# Patient Record
Sex: FEMALE | Race: WHITE | NOT HISPANIC OR LATINO | Employment: PART TIME | URBAN - METROPOLITAN AREA
[De-identification: names, ages, dates, MRNs, and addresses within clinical notes are randomized per-mention and may not be internally consistent; named-entity substitution may affect disease eponyms.]

---

## 2017-07-21 ENCOUNTER — CONVERSION ENCOUNTER (OUTPATIENT)
Dept: MAMMOGRAPHY | Facility: HOSPITAL | Age: 57
End: 2017-07-21

## 2018-11-09 ENCOUNTER — CONVERSION ENCOUNTER (OUTPATIENT)
Dept: MAMMOGRAPHY | Facility: HOSPITAL | Age: 58
End: 2018-11-09

## 2020-01-17 ENCOUNTER — HOSPITAL ENCOUNTER (OUTPATIENT)
Dept: MAMMOGRAPHY | Facility: HOSPITAL | Age: 60
Discharge: HOME | End: 2020-01-17
Attending: INTERNAL MEDICINE

## 2020-05-19 ENCOUNTER — HOSPITAL ENCOUNTER (OUTPATIENT)
Dept: OTHER | Facility: HOSPITAL | Age: 60
Discharge: HOME | End: 2020-05-19
Attending: INTERNAL MEDICINE

## 2020-05-19 LAB
25(OH)D3 SERPL-MCNC: 52.8 NG/ML (ref 30–100)
ALBUMIN SERPL-MCNC: 4.6 G/DL (ref 3.5–5)
ALBUMIN/GLOB SERPL: 1.7 {RATIO} (ref 1.4–2.6)
ALP SERPL-CCNC: 82 U/L (ref 53–141)
ALT SERPL-CCNC: 18 U/L (ref 10–40)
ANION GAP SERPL CALC-SCNC: 21 MMOL/L (ref 8–19)
AST SERPL-CCNC: 20 U/L (ref 15–50)
BASOPHILS # BLD AUTO: 0.05 10*3/UL (ref 0–0.2)
BASOPHILS NFR BLD AUTO: 1 % (ref 0–3)
BILIRUB SERPL-MCNC: 0.71 MG/DL (ref 0.2–1.3)
BUN SERPL-MCNC: 10 MG/DL (ref 5–25)
BUN/CREAT SERPL: 12 {RATIO} (ref 6–20)
CALCIUM SERPL-MCNC: 9.9 MG/DL (ref 8.7–10.4)
CHLORIDE SERPL-SCNC: 104 MMOL/L (ref 99–111)
CHOLEST SERPL-MCNC: 189 MG/DL (ref 107–200)
CHOLEST/HDLC SERPL: 2.4 {RATIO} (ref 3–6)
CK SERPL-CCNC: 86 U/L (ref 35–230)
CONV ABS IMM GRAN: 0.01 10*3/UL (ref 0–0.2)
CONV CO2: 20 MMOL/L (ref 22–32)
CONV IMMATURE GRAN: 0.2 % (ref 0–1.8)
CONV TOTAL PROTEIN: 7.3 G/DL (ref 6.3–8.2)
CREAT UR-MCNC: 0.81 MG/DL (ref 0.5–0.9)
DEPRECATED RDW RBC AUTO: 44.6 FL (ref 36.4–46.3)
EOSINOPHIL # BLD AUTO: 0.11 10*3/UL (ref 0–0.7)
EOSINOPHIL # BLD AUTO: 2.3 % (ref 0–7)
ERYTHROCYTE [DISTWIDTH] IN BLOOD BY AUTOMATED COUNT: 12.4 % (ref 11.7–14.4)
GFR SERPLBLD BASED ON 1.73 SQ M-ARVRAT: >60 ML/MIN/{1.73_M2}
GLOBULIN UR ELPH-MCNC: 2.7 G/DL (ref 2–3.5)
GLUCOSE SERPL-MCNC: 89 MG/DL (ref 65–99)
HCT VFR BLD AUTO: 46.9 % (ref 37–47)
HDLC SERPL-MCNC: 79 MG/DL (ref 40–60)
HGB BLD-MCNC: 14.7 G/DL (ref 12–16)
LDLC SERPL CALC-MCNC: 99 MG/DL (ref 70–100)
LYMPHOCYTES # BLD AUTO: 1.48 10*3/UL (ref 1–5)
LYMPHOCYTES NFR BLD AUTO: 31 % (ref 20–45)
MCH RBC QN AUTO: 30.6 PG (ref 27–31)
MCHC RBC AUTO-ENTMCNC: 31.3 G/DL (ref 33–37)
MCV RBC AUTO: 97.5 FL (ref 81–99)
MONOCYTES # BLD AUTO: 0.39 10*3/UL (ref 0.2–1.2)
MONOCYTES NFR BLD AUTO: 8.2 % (ref 3–10)
NEUTROPHILS # BLD AUTO: 2.73 10*3/UL (ref 2–8)
NEUTROPHILS NFR BLD AUTO: 57.3 % (ref 30–85)
NRBC CBCN: 0 % (ref 0–0.7)
OSMOLALITY SERPL CALC.SUM OF ELEC: 291 MOSM/KG (ref 273–304)
PLATELET # BLD AUTO: 237 10*3/UL (ref 130–400)
PMV BLD AUTO: 11.4 FL (ref 9.4–12.3)
POTASSIUM SERPL-SCNC: 4.4 MMOL/L (ref 3.5–5.3)
RBC # BLD AUTO: 4.81 10*6/UL (ref 4.2–5.4)
SODIUM SERPL-SCNC: 141 MMOL/L (ref 135–147)
TRIGL SERPL-MCNC: 53 MG/DL (ref 40–150)
VLDLC SERPL-MCNC: 11 MG/DL (ref 5–37)
WBC # BLD AUTO: 4.77 10*3/UL (ref 4.8–10.8)

## 2020-05-20 LAB
FOLATE SERPL-MCNC: >20 NG/ML (ref 4.8–20)
VIT B12 SERPL-MCNC: 979 PG/ML (ref 211–911)

## 2021-03-05 ENCOUNTER — HOSPITAL ENCOUNTER (OUTPATIENT)
Dept: MAMMOGRAPHY | Facility: HOSPITAL | Age: 61
Discharge: HOME | End: 2021-03-05
Attending: INTERNAL MEDICINE

## 2022-02-04 ENCOUNTER — TRANSCRIBE ORDERS (OUTPATIENT)
Dept: ADMINISTRATIVE | Facility: HOSPITAL | Age: 62
End: 2022-02-04

## 2022-02-04 DIAGNOSIS — Z12.31 SCREENING MAMMOGRAM FOR BREAST CANCER: Primary | ICD-10-CM

## 2022-03-18 ENCOUNTER — HOSPITAL ENCOUNTER (OUTPATIENT)
Dept: MAMMOGRAPHY | Facility: HOSPITAL | Age: 62
Discharge: HOME | End: 2022-03-18
Admitting: INTERNAL MEDICINE

## 2022-03-18 DIAGNOSIS — Z12.31 SCREENING MAMMOGRAM FOR BREAST CANCER: ICD-10-CM

## 2022-03-18 PROCEDURE — 77063 BREAST TOMOSYNTHESIS BI: CPT

## 2022-03-18 PROCEDURE — 77067 SCR MAMMO BI INCL CAD: CPT

## 2023-02-23 ENCOUNTER — TRANSCRIBE ORDERS (OUTPATIENT)
Dept: ADMINISTRATIVE | Facility: HOSPITAL | Age: 63
End: 2023-02-23
Payer: COMMERCIAL

## 2023-02-23 DIAGNOSIS — Z12.31 ENCOUNTER FOR SCREENING MAMMOGRAM FOR BREAST CANCER: Primary | ICD-10-CM

## 2023-05-12 ENCOUNTER — HOSPITAL ENCOUNTER (OUTPATIENT)
Dept: MAMMOGRAPHY | Facility: HOSPITAL | Age: 63
Discharge: HOME | End: 2023-05-12
Admitting: INTERNAL MEDICINE
Payer: COMMERCIAL

## 2023-05-12 DIAGNOSIS — Z12.31 ENCOUNTER FOR SCREENING MAMMOGRAM FOR BREAST CANCER: ICD-10-CM

## 2023-05-12 PROCEDURE — 77063 BREAST TOMOSYNTHESIS BI: CPT

## 2023-05-12 PROCEDURE — 77067 SCR MAMMO BI INCL CAD: CPT

## 2023-10-24 ENCOUNTER — TRANSCRIBE ORDERS (OUTPATIENT)
Dept: ADMINISTRATIVE | Facility: HOSPITAL | Age: 63
End: 2023-10-24
Payer: COMMERCIAL

## 2023-10-24 DIAGNOSIS — M81.0 SENILE OSTEOPOROSIS: Primary | ICD-10-CM

## 2023-11-01 ENCOUNTER — HOSPITAL ENCOUNTER (OUTPATIENT)
Dept: BONE DENSITY | Facility: HOSPITAL | Age: 63
Discharge: HOME | End: 2023-11-01
Admitting: INTERNAL MEDICINE
Payer: COMMERCIAL

## 2023-11-01 DIAGNOSIS — M81.0 SENILE OSTEOPOROSIS: ICD-10-CM

## 2023-11-01 PROCEDURE — 77080 DXA BONE DENSITY AXIAL: CPT

## 2025-05-19 NOTE — PROGRESS NOTES
"GYN new patient    CC: prolapse    Tobacco/Nicotine use:  No    HPI:   65 y.o. Contraception or HRT: Post menopausal, using no HRT    History of Present Illness  The patient presents for evaluation of a prolapse.    She was previously diagnosed with a prolapse during a Pap smear examination. She reports no symptoms such as bulging, pressure, or difficulty in bladder emptying. She is not sexually active and experienced significant discomfort during the Pap smear procedure. She retains her uterus and expresses a preference for natural treatment options.    GYNECOLOGICAL HISTORY:  - Gynecology History discussed      History: PMHx, Meds, Allergies, PSHx, Social Hx, and POBHx all reviewed and updated.  PCP:Carmenza Lucero MD      Review of Systems     /74   Pulse 89   Ht 162.6 cm (64\")   Wt 59 kg (130 lb)   Breastfeeding No   BMI 22.31 kg/m²     Physical Exam  Exam conducted with a chaperone present.   Genitourinary:     Urethra: No prolapse or urethral lesion.      Vagina: Prolapsed vaginal walls (Stage I cystocele, motor 5 out of 5 levator ani) present.      Uterus: With uterine prolapse.       Adnexa: Right adnexa normal and left adnexa normal.             ASSESSMENT AND PLAN:  Problem Visit    Diagnoses and all orders for this visit:    1. Cystocele with prolapse (Primary)  -     Ambulatory Referral to Physical Therapy for Evaluation & Treatment    2. Genitourinary syndrome of menopause  -     estradiol (ESTRACE VAGINAL) 0.1 MG/GM vaginal cream; Place 0.5 gm PV and massage 0.5 gm to vulva and vestibule at night 2x/week  Dispense: 42.5 g; Refill: 6        Assessment & Plan  1. Genitourinary syndrome of menopause.  A prescription for estrogen cream was provided, with instructions to apply it twice weekly at bedtime. Half of the cream should be inserted into the vagina and massaged into the vaginal walls, while the remaining half should be applied to the clitoris, clitoral myers, labia minora, and " around the bladder opening. Reduce the frequency of application to once weekly if any burning sensation occurs, and gradually increase to twice weekly as tolerated. A referral for pelvic floor physical therapy was made.    Follow-up  Follow up in 1 year for the annual exam.              Follow Up:  Return in about 1 year (around 5/21/2026) for Annual physical.        Nitza Lamas MD  05/21/2025    Patient or patient representative verbalized consent for the use of Ambient Listening during the visit with  Nitza Lamas MD for chart documentation. 5/21/2025  13:22 EDT

## 2025-05-21 ENCOUNTER — OFFICE VISIT (OUTPATIENT)
Dept: OBSTETRICS AND GYNECOLOGY | Age: 65
End: 2025-05-21
Payer: COMMERCIAL

## 2025-05-21 VITALS
WEIGHT: 130 LBS | SYSTOLIC BLOOD PRESSURE: 137 MMHG | DIASTOLIC BLOOD PRESSURE: 74 MMHG | BODY MASS INDEX: 22.2 KG/M2 | HEART RATE: 89 BPM | HEIGHT: 64 IN

## 2025-05-21 DIAGNOSIS — N95.8 GENITOURINARY SYNDROME OF MENOPAUSE: ICD-10-CM

## 2025-05-21 DIAGNOSIS — N81.4 CYSTOCELE WITH PROLAPSE: Primary | ICD-10-CM

## 2025-05-21 RX ORDER — ALENDRONATE SODIUM 70 MG/1
1 TABLET ORAL WEEKLY
COMMUNITY
Start: 2025-05-13

## 2025-05-21 RX ORDER — ESCITALOPRAM OXALATE 10 MG/1
10 TABLET ORAL
COMMUNITY
Start: 2025-03-11 | End: 2025-05-21

## 2025-05-21 RX ORDER — CHOLECALCIFEROL (VITAMIN D3) 25 MCG
TABLET ORAL
COMMUNITY

## 2025-05-21 RX ORDER — ESTRADIOL 0.1 MG/G
CREAM VAGINAL
Qty: 42.5 G | Refills: 6 | Status: SHIPPED | OUTPATIENT
Start: 2025-05-21

## 2025-05-21 RX ORDER — ATORVASTATIN CALCIUM 10 MG/1
1 TABLET, FILM COATED ORAL DAILY
COMMUNITY
Start: 2025-03-11

## 2025-05-21 NOTE — LETTER
"May 21, 2025     [facility]  [address]  [place] [address]    Patient: Amada Peck   YOB: 1960   Date of Visit: 2025     Dear [facility]:       Thank you for seeing Amada Peck for me for evaluation. Below are the relevant portions of my assessment and plan of care.    If you have questions, please do not hesitate to call me. I look forward to following Amada along with you.         Sincerely,        Nitza Lamas MD        CC: No Recipients    Nitza Lamas MD  25 1322  Sign when Signing Visit  GYN new patient    CC: prolapse    Tobacco/Nicotine use:  No    HPI:   65 y.o. Contraception or HRT: Post menopausal, using no HRT    History of Present Illness  The patient presents for evaluation of a prolapse.    She was previously diagnosed with a prolapse during a Pap smear examination. She reports no symptoms such as bulging, pressure, or difficulty in bladder emptying. She is not sexually active and experienced significant discomfort during the Pap smear procedure. She retains her uterus and expresses a preference for natural treatment options.    GYNECOLOGICAL HISTORY:  - Gynecology History discussed      History: PMHx, Meds, Allergies, PSHx, Social Hx, and POBHx all reviewed and updated.  PCP:Carmenza Lucero MD      Review of Systems     /74   Pulse 89   Ht 162.6 cm (64\")   Wt 59 kg (130 lb)   Breastfeeding No   BMI 22.31 kg/m²     Physical Exam  Exam conducted with a chaperone present.   Genitourinary:     Urethra: No prolapse or urethral lesion.      Vagina: Prolapsed vaginal walls (Stage I cystocele, motor 5 out of 5 levator ani) present.      Uterus: With uterine prolapse.       Adnexa: Right adnexa normal and left adnexa normal.             ASSESSMENT AND PLAN:  Problem Visit    Diagnoses and all orders for this visit:    1. Cystocele with prolapse (Primary)  -     Ambulatory " Referral to Physical Therapy for Evaluation & Treatment    2. Genitourinary syndrome of menopause  -     estradiol (ESTRACE VAGINAL) 0.1 MG/GM vaginal cream; Place 0.5 gm PV and massage 0.5 gm to vulva and vestibule at night 2x/week  Dispense: 42.5 g; Refill: 6        Assessment & Plan  1. Genitourinary syndrome of menopause.  A prescription for estrogen cream was provided, with instructions to apply it twice weekly at bedtime. Half of the cream should be inserted into the vagina and massaged into the vaginal walls, while the remaining half should be applied to the clitoris, clitoral myers, labia minora, and around the bladder opening. Reduce the frequency of application to once weekly if any burning sensation occurs, and gradually increase to twice weekly as tolerated. A referral for pelvic floor physical therapy was made.    Follow-up  Follow up in 1 year for the annual exam.              Follow Up:  Return in about 1 year (around 5/21/2026) for Annual physical.        Nitza Lamas MD  05/21/2025    Patient or patient representative verbalized consent for the use of Ambient Listening during the visit with  Nitza Lamas MD for chart documentation. 5/21/2025  13:22 EDT